# Patient Record
Sex: FEMALE | Race: WHITE
[De-identification: names, ages, dates, MRNs, and addresses within clinical notes are randomized per-mention and may not be internally consistent; named-entity substitution may affect disease eponyms.]

---

## 2017-01-03 ENCOUNTER — HOSPITAL ENCOUNTER (EMERGENCY)
Dept: HOSPITAL 39 - ER | Age: 42
Discharge: HOME | End: 2017-01-03
Payer: COMMERCIAL

## 2017-01-03 VITALS — DIASTOLIC BLOOD PRESSURE: 84 MMHG | SYSTOLIC BLOOD PRESSURE: 124 MMHG

## 2017-01-03 VITALS — OXYGEN SATURATION: 98 % | TEMPERATURE: 97.8 F

## 2017-01-03 DIAGNOSIS — Z88.8: ICD-10-CM

## 2017-01-03 DIAGNOSIS — J01.40: Primary | ICD-10-CM

## 2017-01-03 DIAGNOSIS — E11.9: ICD-10-CM

## 2017-01-03 DIAGNOSIS — Z88.6: ICD-10-CM

## 2017-01-03 DIAGNOSIS — R05: ICD-10-CM

## 2017-01-03 NOTE — ED.PDOC
History of Present Illness





- General


Chief Complaint: General


Stated Complaint: cough,congestion,ha


Time Seen by Provider: 01/03/17 18:54


Source: patient, RN notes reviewed, Vital Signs reviewed


Exam Limitations: no limitations





- History of Present Illness


Initial Comments: 


This 40 y/o female has had nasal congestion, cough and headache for 9 days.  

She has taken over the counter cough and cold without relief.  She denies 

fever.  Cough is non-productive.  


Timing/Duration: getting worse, other - 9 days


Severity: moderate


Improving Factors: nothing


Worsening Factors: nothing


Associated Symptoms: cough, headaches, nausea/vomiting - nausea only, shortness 

of breath, weakness


Allergies/Adverse Reactions: 


Allergies





Morphine Allergy (Severe, Verified 06/08/15 17:43)


 


Acetaminophen [From Darvocet-N] Allergy (Unknown, Verified 06/08/15 17:43)


 


Propoxyphene [From Darvocet-N] Allergy (Unknown, Verified 06/08/15 17:43)


 


Aspirin Allergy (Verified 06/08/15 17:43)


 


Codeine Allergy (Verified 06/08/15 17:43)


 


Hydrocodone Allergy (Verified 06/08/15 17:43)


 


Meperidine [From Demerol HCl] Allergy (Verified 06/08/15 17:43)


 








Home Medications: 


Ambulatory Orders





Amoxicillin & Pot Clavulanate [Augmentin] 1 tab PO BID #220 tab 01/03/17 


Benzonatate 200 mg PO TID PRN #30 cap 01/03/17 


Fluticasone Prop 0.05% Nasal [Flonase Nasal Spray] 2 spray BNAS DAILY PRN #1 

bottle 01/03/17 











Review of Systems





- Review of Systems


Constitutional: States: chills, weakness


EENTM: States: ear pain, nose pain, nose congestion


Respiratory: States: cough, short of breath


Cardiology: States: no symptoms reported


Gastrointestinal/Abdominal: States: nausea


Genitourinary: States: no symptoms reported


Musculoskeletal: States: no symptoms reported


Skin: States: no symptoms reported


Neurological: States: headache


Endocrine: States: intolerance to cold


Hematologic/Lymphatic: States: no symptoms reported


All other Systems: Reviewed and Negative





Past Medical History (General)





- Patient Medical History


Hx Seizures: No


Hx Stroke: No


Hx Dementia: No


Hx Asthma: No


Hx of COPD: No


Hx Cardiac Disorders: No


Hx Congestive Heart Failure: No


Hx Pacemaker: No


Hx Hypertension: No


Hx Thyroid Disease: No


Hx Diabetes: Yes


Hx Gastroesophageal Reflux: No


Hx Renal Disease: No


Hx Cancer: No


Hx of HIV: No


Hx Hepatitis C: No


Hx MRSA: No


MRSA Source:: Wound


Surgical History: appendectomy, Hysterectomy





- Vaccination History


Hx Tetanus, Diphtheria Vaccination: Yes


Hx Influenza Vaccination: Yes





- Social History


Hx Tobacco Use: No


Hx Chewing Tobacco Use: No


Hx Alcohol Use: No


Hx Substance Use: No


Hx Substance Use Treatment: No


Hx Depression: No


Hx Physical Abuse: No


Hx Emotional Abuse: No


Hx Suspected Abuse: No





- Female History


Patient is a Female of Child Bearing Age (10 -59 yrs old): Yes


Patient Pregnant: No





Family Medical History





- Family History


  ** Mother


Family History: No Known





Physical Exam





- Physical Exam


General Appearance: Alert, Obese


Eye Exam: bilateral normal


Ears, Nose, Throat: hearing grossly normal, normal ENT inspection, normal 

pharynx, sinus pain/drainage - bilateral frontal and maxillary sinus tenderness

, nasal congestion


Neck: full range of motion, lymphadenopathy (R), lymphadenopathy (L)


Respiratory: lungs clear, normal breath sounds, no respiratory distress, no 

accessory muscle use


Cardiovascular/Chest: regular rate, rhythm, no edema, no gallop, no murmur


Gastrointestinal/Abdominal: normal bowel sounds, non tender, soft


Extremity: normal range of motion


Neurologic: alert, normal mood/affect, oriented x 3


Skin Exam: normal color, warm/dry





Progress





- Results/Orders


Results/Orders: 


 











  01/03/17





  17:46


 


Temperature 97.8 F


 


Pulse Rate [rt 94 H





brachial] 


 


Respiratory 20





Rate 


 


Blood Pressure 120/89





[rt arm] 


 


O2 Sat by Pulse 98





Oximetry 




















Departure





- Departure


Clinical Impression: 


 Cough





Acute sinusitis


Qualifiers:


 Sinusitis location: pansinusitis Recurrence: not specified Qualifier Code: (

J01.40) Acute pansinusitis, unspecified


Time of Disposition: 19:11


Disposition: Discharge to Home or Self Care


Departure Forms:  ED Discharge - Pt. Copy, Patient Portal Self Enrollment


Instructions:  DI for Sinusitis, Sinusitis


Diet: resume usual diet


Prescriptions: 


Amoxicillin & Pot Clavulanate [Augmentin] 1 tab PO BID #220 tab


Benzonatate 200 mg PO TID PRN #30 cap


 PRN Reason: Cough


Fluticasone Prop 0.05% Nasal [Flonase Nasal Spray] 2 spray BNAS DAILY PRN #1 

bottle


 PRN Reason: Nasal Congestion


Home Medications: 


Ambulatory Orders





Amoxicillin & Pot Clavulanate [Augmentin] 1 tab PO BID #220 tab 01/03/17 


Benzonatate 200 mg PO TID PRN #30 cap 01/03/17 


Fluticasone Prop 0.05% Nasal [Flonase Nasal Spray] 2 spray BNAS DAILY PRN #1 

bottle 01/03/17 








Additional Instructions: 


Over the counter Mucinex-D and Neti Pot (use distilled water only)

## 2019-01-02 ENCOUNTER — HOSPITAL ENCOUNTER (EMERGENCY)
Dept: HOSPITAL 39 - ER | Age: 44
Discharge: HOME | End: 2019-01-02
Payer: SELF-PAY

## 2019-01-02 VITALS — OXYGEN SATURATION: 96 % | DIASTOLIC BLOOD PRESSURE: 85 MMHG | SYSTOLIC BLOOD PRESSURE: 148 MMHG

## 2019-01-02 VITALS — TEMPERATURE: 96.9 F

## 2019-01-02 DIAGNOSIS — Z88.5: ICD-10-CM

## 2019-01-02 DIAGNOSIS — Z88.6: ICD-10-CM

## 2019-01-02 DIAGNOSIS — H60.501: ICD-10-CM

## 2019-01-02 DIAGNOSIS — H66.91: Primary | ICD-10-CM

## 2019-01-02 DIAGNOSIS — Z88.8: ICD-10-CM

## 2019-01-02 DIAGNOSIS — E11.9: ICD-10-CM

## 2019-01-02 NOTE — ED.PDOC
History of Present Illness





- General


Chief Complaint: ENT Problem


Stated Complaint: right ear pain with hearing loss


Time Seen by Provider: 01/02/19 18:55


Source: patient





- History of Present Illness


Initial Comments: 





RIGHT EAR PAIN X THREE DAYS, SOUNDS SEEMS MUFFLED.  DENIES ANY FEVER BUT JUST 

GOT OVER A SINUS INFECTION.  


Timing/Duration: gradual


EENT Location: ear (R)


Prearrival Treatment: over the counter meds


Improving Factors: nothing


Worsening Factors: nothing


Associated Symptoms: change in hearing, malaise


Allergies/Adverse Reactions: 


Allergies





Morphine Allergy (Severe, Verified 06/08/15 17:43)


   


Acetaminophen [From Darvocet-N] Allergy (Unknown, Verified 06/08/15 17:43)


   


Propoxyphene [From Darvocet-N] Allergy (Unknown, Verified 06/08/15 17:43)


   


Aspirin Allergy (Verified 06/08/15 17:43)


   


Codeine Allergy (Verified 06/08/15 17:43)


   


Hydrocodone Allergy (Verified 06/08/15 17:43)


   


Meperidine [From Demerol HCl] Allergy (Verified 06/08/15 17:43)


   








Home Medications: 


Ambulatory Orders





Amoxicillin & Pot Clavulanate [Augmentin Tab] 875 mg PO BID #20 tab 01/02/19 


Jas/Poly/Hc Otic Susp [Cortisporin Otic Susp] 10 ml OTIC Q6HRS #1 bttl 01/02/19 


Tramadol HCl 50 mg PO Q6HRS #20 tab 01/02/19 











Review of Systems





- Review of Systems


Constitutional: States: no symptoms reported


EENTM: States: ear pain


Respiratory: States: no symptoms reported


Cardiology: States: no symptoms reported


Gastrointestinal/Abdominal: States: no symptoms reported


Genitourinary: States: no symptoms reported


Musculoskeletal: States: no symptoms reported


Skin: States: no symptoms reported


Neurological: States: no symptoms reported





Past Medical History (General)





- Patient Medical History


Hx Seizures: No


Hx Stroke: No


Hx Dementia: No


Hx Asthma: No


Hx of COPD: No


Hx Cardiac Disorders: No


Hx Congestive Heart Failure: No


Hx Pacemaker: No


Hx Hypertension: No


Hx Thyroid Disease: No


Hx Diabetes: Yes


Hx Gastroesophageal Reflux: No


Hx Renal Disease: No


Hx Cancer: No


Hx of HIV: No


Hx Hepatitis C: No


Hx MRSA: No


MRSA Source:: Wound


Surgical History: Hysterectomy





- Vaccination History


Hx Tetanus, Diphtheria Vaccination: Yes


Hx Influenza Vaccination: No





- Social History


Hx Tobacco Use: No


Hx Chewing Tobacco Use: No


Hx Alcohol Use: No


Hx Substance Use: No


Hx Substance Use Treatment: No


Hx Depression: No


Hx Physical Abuse: No


Hx Emotional Abuse: No


Hx Suspected Abuse: No





- Female History


Patient Pregnant: No





Family Medical History





- Family History


  ** Mother


Family History: No Known





Physical Exam





- Physical Exam


General Appearance: Alert, Well Developed, Well Groomed, Well Hydrated, Well 

Nourished


Eye Exam: bilateral normal, bilateral abnormal EOM


Ear Exam: left ear: canal normal - CANAL IS RED WITH MILD SWELLING, TM red


Nasal Exam: normal inspection


Neck: non-tender


Cardiovascular/Respiratory: regular rate, rhythm, no M/R/G, normal breath 

sounds, no respiratory distress


Abdominal Exam: non-tender, no organomegaly, no hernia


Skin Exam: normal color





Departure





- Departure


Clinical Impression: 


Otitis media


Qualifiers:


 Otitis media type: unspecified Chronicity: acute Qualified Code(s): H66.90 - 

Otitis media, unspecified, unspecified ear





Otitis externa


Qualifiers:


 Otitis externa type: unspecified type Chronicity: acute Laterality: right 

Qualified Code(s): H60.501 - Unspecified acute noninfective otitis externa, 

right ear





Time of Disposition: 18:59


Disposition: Discharge to Home or Self Care


Condition: Good


Departure Forms:  ED Discharge - Pt. Copy, Patient Portal Self Enrollment


Instructions:  DI for Otitis Externa, DI for Ear Pain-Adult


Diet: resume usual diet


Referrals: 


Guru Sotelo MD [Primary Care Provider] - 1-2 Weeks


Prescriptions: 


Jas/Poly/Hc Otic Susp [Cortisporin Otic Susp] 10 ml OTIC Q6HRS #1 bttl


Tramadol HCl 50 mg PO Q6HRS #20 tab


Amoxicillin & Pot Clavulanate [Augmentin Tab] 875 mg PO BID #20 tab


Home Medications: 


Ambulatory Orders





Amoxicillin & Pot Clavulanate [Augmentin Tab] 875 mg PO BID #20 tab 01/02/19 


Jas/Poly/Hc Otic Susp [Cortisporin Otic Susp] 10 ml OTIC Q6HRS #1 bttl 01/02/19 


Tramadol HCl 50 mg PO Q6HRS #20 tab 01/02/19

## 2020-12-15 ENCOUNTER — HOSPITAL ENCOUNTER (OUTPATIENT)
Dept: HOSPITAL 39 - MAMMO | Age: 45
End: 2020-12-15
Attending: FAMILY MEDICINE
Payer: COMMERCIAL

## 2020-12-15 DIAGNOSIS — Z12.31: Primary | ICD-10-CM

## 2020-12-16 NOTE — MAM
EXAM DESCRIPTION: 

3D Screening BILATERAL : Digital Mammography.



CLINICAL HISTORY: 

45 years Female SCREENING .. No complaints and no family history

of breast cancer. Menarche age 14. Childbirth age 17. Menopause

age unknown. No HRT.. Lifetime risk of developing breast cancer

(Tyrer-Cuzick model)(%):  6.3.



COMPARISON: 

Bilateral screening digital breast 2-D imaging October 2015  No

prior reports available.  



TECHNIQUE: 

Bilateral CC and MLO projection full-field images, digital

tomosynthesis mammographic technique.  Bilateral digital 2-D

full-field MLO images.  CAD available for 2-D images.  



FINDINGS: 

The breast parenchymal density pattern is: Heterogeneously dense

breast tissue, which may obscure small masses.  Solitary

microcalcifications. Vascular calcifications. Groups of benign

type calcifications. Intramammary circumscribed nodules

bilaterally are stable. Some nodules are associated with benign

type calcifications.  No skin thickening or nipple retraction

No new focal, stellate mass or density, focal asymmetry , and no

suspicious microcalcifications bilaterally. Stable mammograms

compared to prior study.  Taking into account, differences in

mammographic technique.



IMPRESSION: 

Benign exam.



BIRAD CATEGORY: 2 BENIGN FINDINGS.



RECOMMENDATIONS:

FOLLOW UP: Routine digital bilateral  mammographic screening, one

year interval from  December 2020.



Written communication explaining the IMPRESSION and follow-up,

will be mailed to the patient and referring health care provider.



According to the [employer], yearly mammograms

are recommended starting at age 40 and continuing as long as a

woman is in good health.  Any breast change noted on a breast

self-exam should be reported promptly to the patient's healthcare

provider.  Breast MRI is recommended for women with an

approximately 20-25% or greater lifetime risk of breast cancer,

including women with a strong family history of breast or ovarian

cancer and women who have been treated for Hodgkin's disease.  A

negative mammographic report should not delay tissue diagnosis in

patients with significant clinical history or physical findings. 

Extremely dense breast tissue limits the sensitivity of digital

mammography. 





Electronically signed by:  Avelino Lundberg MD  12/16/2020 8:39 PM

[facility] Workstation: [id number]